# Patient Record
Sex: FEMALE | Race: BLACK OR AFRICAN AMERICAN
[De-identification: names, ages, dates, MRNs, and addresses within clinical notes are randomized per-mention and may not be internally consistent; named-entity substitution may affect disease eponyms.]

---

## 2017-12-21 ENCOUNTER — HOSPITAL ENCOUNTER (EMERGENCY)
Dept: HOSPITAL 88 - ER | Age: 37
Discharge: HOME | End: 2017-12-21
Payer: MEDICARE

## 2017-12-21 VITALS — WEIGHT: 165 LBS | HEIGHT: 62 IN | BODY MASS INDEX: 30.36 KG/M2

## 2017-12-21 DIAGNOSIS — J09.X2: ICD-10-CM

## 2017-12-21 DIAGNOSIS — I10: ICD-10-CM

## 2017-12-21 DIAGNOSIS — R50.9: Primary | ICD-10-CM

## 2017-12-21 DIAGNOSIS — R05: ICD-10-CM

## 2017-12-21 LAB
FLUAV + FLUBV AG SPEC IF: (no result)
S PYO AG THROAT QL: NEGATIVE

## 2017-12-21 PROCEDURE — 87070 CULTURE OTHR SPECIMN AEROBIC: CPT

## 2017-12-21 PROCEDURE — 99283 EMERGENCY DEPT VISIT LOW MDM: CPT

## 2017-12-21 PROCEDURE — 71020: CPT

## 2017-12-21 PROCEDURE — 87400 INFLUENZA A/B EACH AG IA: CPT

## 2017-12-21 PROCEDURE — 83518 IMMUNOASSAY DIPSTICK: CPT

## 2017-12-21 NOTE — DIAGNOSTIC IMAGING REPORT
PROCEDURE:CHEST 2 VIEWS

TECHNIQUE:PA and lateral chest

INDICATION:Cough, congestion; possible flu.

COMPARISON:None.

 

FINDINGS:

Lungs are clear and symmetrically inflated. No pleural effusions. 

Normal heart size, mediastinal contour, and pulmonary vasculature. 

Questionable trace central peribronchial thickening. Intact skeleton.

 

 

CONCLUSION:

Mild bronchial thickening may reflect bronchitis, atypical, or viral 

pneumonia in the appropriate context. 

 

 

 

Dictated by:  Stevenson Dumont M.D. on 12/21/2017 at 11:51     

Electronically approved by:  Stevenson Dumont M.D. on 12/21/2017 at 

11:51